# Patient Record
Sex: MALE | Race: BLACK OR AFRICAN AMERICAN | NOT HISPANIC OR LATINO | URBAN - METROPOLITAN AREA
[De-identification: names, ages, dates, MRNs, and addresses within clinical notes are randomized per-mention and may not be internally consistent; named-entity substitution may affect disease eponyms.]

---

## 2017-01-17 ENCOUNTER — EMERGENCY (EMERGENCY)
Facility: HOSPITAL | Age: 32
LOS: 1 days | Discharge: HOME | End: 2017-01-17
Attending: EMERGENCY MEDICINE | Admitting: EMERGENCY MEDICINE
Payer: COMMERCIAL

## 2017-01-17 VITALS
OXYGEN SATURATION: 99 % | RESPIRATION RATE: 16 BRPM | HEART RATE: 65 BPM | HEIGHT: 72 IN | SYSTOLIC BLOOD PRESSURE: 112 MMHG | WEIGHT: 171.96 LBS | TEMPERATURE: 98 F | DIASTOLIC BLOOD PRESSURE: 74 MMHG

## 2017-01-17 DIAGNOSIS — R21 RASH AND OTHER NONSPECIFIC SKIN ERUPTION: ICD-10-CM

## 2017-01-17 PROCEDURE — 99284 EMERGENCY DEPT VISIT MOD MDM: CPT

## 2017-01-17 NOTE — ED PROVIDER NOTE - GENITOURINARY, MLM
raised, scabbed over rash to dorsum of penis with no ulceration, no tenderness, no urethral discharge, no testicular tenderness or masses

## 2017-01-17 NOTE — ED ADULT NURSE NOTE - OBJECTIVE STATEMENT
Pt c/o rash and itching to the perineum area x 4 month. The symptoms are intermittent. Stated "Some time I get sore in mouth." Denies penile discharge, selling, hematuria, pain. C/O gradual hair loss. No distress noted.

## 2017-01-17 NOTE — ED PROVIDER NOTE - MEDICAL DECISION MAKING DETAILS
32 y/o m presents with recurrent genital rash over the past 4 months; has lesions to penis concerning for herpes although have mostly healed, no open ulcer to culture.  Discussed with pt the possibility of herpes, will send hsv antibody test, although informed + result doesn't mean active infection, will f/u with pmd.

## 2017-01-18 LAB
HSV1 IGG SER-ACNC: 57.2 INDEX — HIGH
HSV1 IGG SERPL QL IA: POSITIVE
HSV2 IGG FLD-ACNC: 1.28 INDEX — HIGH
HSV2 IGG SERPL QL IA: POSITIVE

## 2017-01-19 LAB — HSV1 AB FLD QL: NEGATIVE — SIGNIFICANT CHANGE UP

## 2017-01-19 NOTE — ED POST DISCHARGE NOTE - ADDITIONAL DOCUMENTATION
1/20 - pt called, message left about +HSV results, pt placed for further f/u attempts 1/20 - pt called, message left about +HSV results, pt placed for further f/u attempts; 1/26- na x 3, letter sent

## 2017-02-15 ENCOUNTER — APPOINTMENT (OUTPATIENT)
Dept: UROLOGY | Facility: CLINIC | Age: 32
End: 2017-02-15
Payer: MEDICAID

## 2017-02-15 VITALS
BODY MASS INDEX: 22.75 KG/M2 | SYSTOLIC BLOOD PRESSURE: 125 MMHG | HEART RATE: 53 BPM | DIASTOLIC BLOOD PRESSURE: 75 MMHG | HEIGHT: 72 IN | TEMPERATURE: 98.5 F | WEIGHT: 168 LBS

## 2017-02-15 PROCEDURE — 99204 OFFICE O/P NEW MOD 45 MIN: CPT

## 2017-02-16 LAB
APPEARANCE: CLEAR
BACTERIA: ABNORMAL
BILIRUBIN URINE: NEGATIVE
BLOOD URINE: NEGATIVE
COLOR: YELLOW
GLUCOSE QUALITATIVE U: NORMAL MG/DL
HYALINE CASTS: 0 /LPF
KETONES URINE: NEGATIVE
LEUKOCYTE ESTERASE URINE: NEGATIVE
MICROSCOPIC-UA: NORMAL
NITRITE URINE: NEGATIVE
PH URINE: 5.5
PROTEIN URINE: NEGATIVE MG/DL
RED BLOOD CELLS URINE: 2 /HPF
SPECIFIC GRAVITY URINE: 1.02
SQUAMOUS EPITHELIAL CELLS: 2 /HPF
UROBILINOGEN URINE: NORMAL MG/DL
WHITE BLOOD CELLS URINE: 2 /HPF

## 2017-02-17 LAB — BACTERIA UR CULT: NORMAL

## 2017-02-20 LAB — CORE LAB NON GYN CYTOLOGY TO LENOX HILL: NORMAL

## 2017-02-23 ENCOUNTER — FORM ENCOUNTER (OUTPATIENT)
Age: 32
End: 2017-02-23

## 2017-02-24 ENCOUNTER — OUTPATIENT (OUTPATIENT)
Dept: OUTPATIENT SERVICES | Facility: HOSPITAL | Age: 32
LOS: 1 days | End: 2017-02-24
Payer: COMMERCIAL

## 2017-02-24 PROCEDURE — 76770 US EXAM ABDO BACK WALL COMP: CPT | Mod: 26

## 2017-02-24 PROCEDURE — 76770 US EXAM ABDO BACK WALL COMP: CPT

## 2017-03-01 ENCOUNTER — APPOINTMENT (OUTPATIENT)
Dept: UROLOGY | Facility: CLINIC | Age: 32
End: 2017-03-01

## 2017-03-01 VITALS
HEIGHT: 72 IN | HEART RATE: 49 BPM | BODY MASS INDEX: 22.75 KG/M2 | WEIGHT: 168 LBS | SYSTOLIC BLOOD PRESSURE: 114 MMHG | DIASTOLIC BLOOD PRESSURE: 74 MMHG | TEMPERATURE: 98.2 F

## 2017-03-22 ENCOUNTER — APPOINTMENT (OUTPATIENT)
Dept: UROLOGY | Facility: CLINIC | Age: 32
End: 2017-03-22

## 2017-03-22 VITALS
BODY MASS INDEX: 22.75 KG/M2 | SYSTOLIC BLOOD PRESSURE: 118 MMHG | HEART RATE: 50 BPM | WEIGHT: 168 LBS | TEMPERATURE: 98.7 F | HEIGHT: 72 IN | DIASTOLIC BLOOD PRESSURE: 73 MMHG

## 2017-03-22 DIAGNOSIS — N46.9 MALE INFERTILITY, UNSPECIFIED: ICD-10-CM

## 2017-03-22 RX ORDER — CHLORHEXIDINE GLUCONATE, 0.12% ORAL RINSE 1.2 MG/ML
0.12 SOLUTION DENTAL
Qty: 473 | Refills: 0 | Status: ACTIVE | COMMUNITY
Start: 2017-03-15

## 2017-03-27 ENCOUNTER — APPOINTMENT (OUTPATIENT)
Dept: HEART AND VASCULAR | Facility: CLINIC | Age: 32
End: 2017-03-27

## 2017-05-01 ENCOUNTER — APPOINTMENT (OUTPATIENT)
Dept: HEART AND VASCULAR | Facility: CLINIC | Age: 32
End: 2017-05-01

## 2017-05-26 ENCOUNTER — EMERGENCY (EMERGENCY)
Facility: HOSPITAL | Age: 32
LOS: 1 days | Discharge: HOME | End: 2017-05-26
Attending: EMERGENCY MEDICINE | Admitting: EMERGENCY MEDICINE
Payer: COMMERCIAL

## 2017-05-26 VITALS
OXYGEN SATURATION: 99 % | TEMPERATURE: 98 F | RESPIRATION RATE: 17 BRPM | HEART RATE: 54 BPM | SYSTOLIC BLOOD PRESSURE: 111 MMHG | DIASTOLIC BLOOD PRESSURE: 64 MMHG | WEIGHT: 177.03 LBS

## 2017-05-26 DIAGNOSIS — L65.9 NONSCARRING HAIR LOSS, UNSPECIFIED: ICD-10-CM

## 2017-05-26 PROCEDURE — 99282 EMERGENCY DEPT VISIT SF MDM: CPT

## 2017-05-26 NOTE — ED PROVIDER NOTE - CHPI ED SYMPTOMS NEG
no vomiting/no numbness/no weakness/no dizziness/no tingling/no chills/no fever/no pain/no decreased eating/drinking/no nausea

## 2017-05-26 NOTE — ED PROVIDER NOTE - MEDICAL DECISION MAKING DETAILS
patient has no identifiable emergent condition.  he was reassured and given follow up with dermatology

## 2017-05-26 NOTE — ED PROVIDER NOTE - OBJECTIVE STATEMENT
31 y M with "itchy scalp"  Patient complains of scalp pruritis and ha 31 y M with "itchy scalp"  Patient complains of scalp pruritis and hair loss x 2 weeks   Hair loss only scalp   Denies fever or any other complaint  Does not take any medication  Patient under a lot of stress

## 2017-05-26 NOTE — ED ADULT NURSE REASSESSMENT NOTE - NS ED NURSE REASSESS COMMENT FT1
Pt DC'd, pt left ED without notifying ED staff.  Pt ambulatory with even gait, alert and oriented x3.

## 2017-06-21 ENCOUNTER — EMERGENCY (EMERGENCY)
Facility: HOSPITAL | Age: 32
LOS: 1 days | Discharge: HOME | End: 2017-06-21
Admitting: EMERGENCY MEDICINE
Payer: COMMERCIAL

## 2017-06-21 VITALS
TEMPERATURE: 99 F | HEART RATE: 50 BPM | DIASTOLIC BLOOD PRESSURE: 75 MMHG | WEIGHT: 174.83 LBS | OXYGEN SATURATION: 100 % | SYSTOLIC BLOOD PRESSURE: 118 MMHG | RESPIRATION RATE: 14 BRPM

## 2017-06-21 DIAGNOSIS — H57.11 OCULAR PAIN, RIGHT EYE: ICD-10-CM

## 2017-06-21 DIAGNOSIS — H57.8 OTHER SPECIFIED DISORDERS OF EYE AND ADNEXA: ICD-10-CM

## 2017-06-21 PROCEDURE — 99282 EMERGENCY DEPT VISIT SF MDM: CPT

## 2017-06-21 NOTE — ED PROVIDER NOTE - EYES, MLM
right eye +sclera injection, fluorescein stain shows no abrasion/ulcer/fb, visual acuity intact, PERRLA, EOMI

## 2017-06-21 NOTE — ED PROVIDER NOTE - OBJECTIVE STATEMENT
31 y/o m presents stating he scratched his right eye today and then it turned red, tearing.  Pt denies visual changes, photophobia, contact lenses, all other ROS negative.

## 2017-07-19 NOTE — ED PROVIDER NOTE - NS ED MD DISPO DISCHARGE CCDA
ASSESSMENT:  72 year old male with a history of Parkinson's and benign esophageal stricture, who presents with:    1. Post prandial vomiting and diarrhea. Etiology unclear. Distended nontender abdomen.  History of using antidiarrheals. Dr. Snell recommends an abdominal xray to rule out paradoxical diarrhea or severe constipation.       PLAN:  1. Abdominal series.   2. Stop iron. Last CBC WNL. Iron can contribute to constipation.                 HPI: Ray Corona is a  72 year old male with Parkinson's and a history of food impactions, s/p multiple EGD with dilations in the past, who presents with urgent stools and vomiting after eating. Going on since April. Only happens after breakfast and sometimes lunch. Bigger meals cause more problems.  Getting more often, happening few times a week.  Starts with a feeling of constipation or being overly full. Then he vomits and always brown thick liquid comes up. Also has urgent diarrhea at the same time. Usually his stools are twice a day, in the morning and at suppertime. Sometimes no stools and just vomits. Friend giving him anti-diarrheal medication.     This is different from his food impactions. That is painful.  EGD July 29, 2015, remarkable for benign esophageal stricture status post dilation to 18 mm.  On pantoprazole daily.     Eating smaller meals or bland foods has helped. Careful not to eat too much.  Food sticks sometimes but drinking a liquid helps wash it down. Sometimes gets a cough when he eats but not always. Video swallow in 2016 showed significant dysphagia. Repeat video swallow just ordered by neurologist.      Takes sulindac after eating. Changed from Celebrex a month ago. Takes 2 Advil before he goes to bed.          EGD AND COLONOSCOPY REPORT  DATE OF PROCEDURE: 7/29/2015  PROCEDURE: EGD with balloon dilation and Colonoscopy   INDICATION: 70 year old male history of food impaction in the past, dysphagia EGD for further evaluation. Colonoscopy  for screening for colon cancer.  REFERRING PROVIDER:  Dudley Posadas DO  ENDOSCOPIST: Dr. Chad Snell  EQUIPMENT: Olympus GIF and CF scope  ASA CLASS: 2 Normal patient with mild systemic disease  ESTIMATED BLOOD LOSS: <10 ml  ANESTHESIA: Versed 3 mg IV, Fentanyl 75 mcg IV  PROCEDURE TIMES:       Event Tracking          Panel 1             Procedure : COLONOSCOPY      Event Start Time          Procedure Start 1159          Scope In 1201          Cecum Reached 1209          Scope Out 1218          Procedure End                         Procedure : ESOPHAGOGASTRODUODENOSCOPY      Event Start Time          Procedure Start 1154          Scope In            Cecum Reached            Scope Out            Procedure End 1156                       Procedure : ESOPHAGEAL DILATATION      Event Start Time          Procedure Start 1145          Scope In 1151          Cecum Reached            Scope Out 1157          Procedure End 1158                   PATIENT TOLERANCE: Excellent  COMPLICATIONS: None    PROCEDURE:                       Prior to the procedure starting. Risk, benefits and alternatives to the procedure were discussed with the patient. Risks were explained including but not limited to adverse effects of sedation considering their above ASA score, perforation, bleeding and diagnostic error. The patient appeared to understand, asked all questions and agreed with the planned procedure. Written consent was obtained, timeout performed with the patient in the room. The patient also gave permission to leave results on their home/cell phone voicemail if needed. The patient was placed on their left side and conscious sedation was obtained using the medication as above.                        The scope was introduced into the mouth and the upper esophageal sphincter was intubated under direct visualization. The scope was then advanced through the esophagus, stomach and pylorus into the second portion of the duodenum. The scope  was withdrawn slowly with careful examination of the mucosa. Gastric retroflexion was performed and the scope was removed.The patient was turned in the room for the colonoscopy.                       Rectal exam was performed. The scope was introduced into the rectum and advanced under direct visualization to the Cecum. The appendiceal orifice and ileocecal valve were identified by visual landmarks. Prep was Adequate. The scope was then withdrawn slowly with careful visualization of the mucosa. In the rectum, retroflexion was performed and scope was removed.The patient was subsequently transferred to the recovery area in satisfactory condition     FINDINGS EGD:  ? Esophagus - benign-appearing stricture noted at the GE junction. After the rest exam was performed. A through-the-scope balloon was then extended in the stomach. The scope and the balloon were retracted across the GE junction. Initially inflated to 15 mm without resistance and held for 30 seconds. After deflation no evidence of bleeding or tear. Balloon then inflated to 16.5 mm and held for 30 seconds, after deflation, no evidence of bleeding or tear. The balloon was then inflated to 18 mm with resistance and held for 30 seconds. After deflation small amount of blood noted at the stricture site area. However no significant tear or bleeding,  ? Stomach - small hiatal hernia was noted. Diaphragmatic pinch at 42 cm, GE junction at 40 cm   ? Duodenum - Bulb and 2nd portion were normal.      FINDINGS COLONOSCOPY:  ? Diverticula noted in the sigmoid and descending colon  ? Multiple small, vascular lesions in the distal rectum. No evidence of recent bleeding.     ASSESSMENT:  1. Esophageal stricture status post dilation to 18 mm  2. Small hiatal hernia  3. Diverticulosis of the colon  4. 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     RECOMMENDATION:  1. Continue pantoprazole due to esophageal stricture  2. High-fiber diet  3. Repeat colonoscopy in 10  years        Also had EGD's with Dr. Elizabeth (6/18/2015), Dr. Hopper (11/7/2013), Dr. Snell (10/14/2013), Dr. Tejada (11/20/2012)      Past Medical History:   Diagnosis Date   • Arthritis    • Asthma    • AVM (arteriovenous malformation)     rectum   • Chest pain    • Constipation    • Coronary artery disease    • Depression    • Diverticulosis of colon (without mention of hemorrhage)    • Dyslipidemia    • Dysphagia    • Esophageal stricture    • Essential (primary) hypertension    • GERD (gastroesophageal reflux disease)    • Glaucoma    • Malignant neoplasm (CMS/HCC)     prostate 2011   • MI (myocardial infarction) (CMS/HCC) 8/2/2004   • Radiation proctitis    • Rectal bleeding    • Sleep apnea     C-Pap       Past Surgical History:   Procedure Laterality Date   • COLONOSCOPY DIAGNOSTIC  7/29/2015    Dr. Chad Snell. Diverticulosis   • DOPPLER ECHO HEART,COMPLETE  02/18/2011    DONE AT [facility]    • ECHO HEART STRESS  03/21/2009    DOBUTAMINE   • ESOPHAGOGASTRODUODENOSCOPY TRANSORAL FLEX DIAG  11/7/13    Unremarkable - 60F [place]   • ESOPHAGOGASTRODUODENOSCOPY TRANSORAL FLEX DIAG  7/29/2015    Dr. Chad Snell. Esophageal stricture   • HB CATH LT HEART  02/17/2011   • HB CYSTOSCOPY  6/4/14    Flexible-    per Dr. Klein III   • HERNIA REPAIR  4/2003    right and left laproscopic repair   • JOINT REPLACEMENT  4/14/2010    Left total hip replacement   • PTCA  8/4/2004    PTCA/stent LAD and RCA   • SERVICE TO GASTROENTEROLOGY  9/5/1997    EGD-Schatzki's ring dil 30 mm   • SERVICE TO GASTROENTEROLOGY  4/4/2005    EGD-Schatzki's ring dil 50 mm   • SERVICE TO GASTROENTEROLOGY  5/25/2007    colonoscopy - normal   • SERVICE TO GASTROENTEROLOGY  10/28/2008    EGD normal   • SERVICE TO GASTROENTEROLOGY  8/20/2010    EGD with dilatation   • SERVICE TO GASTROENTEROLOGY  2/7/2012    Colonoscopy-cautery AVM rectum       Current Outpatient Prescriptions   Medication   • DULoxetine (CYMBALTA) 60 MG capsule   • MYRBETRIQ 50 MG 24  hr tablet   • sulindac (CLINORIL) 200 MG tablet   • pantoprazole (PROTONIX) 40 MG tablet   • tamsulosin (FLOMAX) 0.4 MG Cap   • pravastatin (PRAVACHOL) 20 MG tablet   • montelukast (SINGULAIR) 10 MG tablet   • busPIRone (BUSPAR) 15 MG tablet   • ezetimibe (ZETIA) 10 MG tablet   • fluticasone-vilanterol (BREO ELLIPTA) 200-25 MCG/INH inhaler   • aspirin 81 MG tablet   • Ferrous Sulfate (IRON) 325 (65 Fe) MG Tab   • Multiple Vitamin (ONE-A-DAY MENS) tablet   • albuterol 108 (90 Base) MCG/ACT inhaler   • albuterol (VENTOLIN) (2.5 MG/3ML) 0.083% nebulizer solution   • cefUROXime (CEFTIN) 250 MG tablet   • carbidopa-levodopa (SINEMET)  MG per tablet   • ramipril (ALTACE) 5 MG capsule   • niacin (NIASPAN) 500 MG CR tablet   • clonazePAM (KLONOPIN) 1 MG tablet   • PROAIR  (90 BASE) MCG/ACT inhaler   • fexofenadine (ALLEGRA) 60 MG tablet   • Brimonidine Tartrate-Timolol 0.2-0.5 % Solution   • acetaminophen (TYLENOL) 500 MG tablet   • dorzolamide (TRUSOPT) 2 % ophthalmic solution   • latanoprost (XALATAN) 0.005 % ophthalmic solution   • Fluticasone-Salmeterol (ADVAIR) 500-50 MCG/DOSE AEPB     No current facility-administered medications for this visit.        ALLERGIES:   Allergen Reactions   • Cat Dander    • Dog Dander    • Dust      Dust mites   • Mold    • Ragweed    • Seasonal Other (See Comments)   • Trees        Family History   Problem Relation Age of Onset   • Heart disease Mother    • Diabetes Mother    • Kidney disease Father    • Diabetes Sister        Social History     Social History   • Marital status:      Spouse name: N/A   • Number of children: N/A   • Years of education: N/A     Occupational History   • Not on file.     Social History Main Topics   • Smoking status: Never Smoker   • Smokeless tobacco: Never Used   • Alcohol use No   • Drug use: No   • Sexual activity: No      Comment:      Other Topics Concern   • Seat Belt Yes     Social History Narrative    ** Merged History  Encounter **         A little wine with communion. No tobacco.     Review of Systems:  Const: No weight loss. No fevers or chills.  Respiratory: No shortness of breath or cough  Cardiovascular: No chest pain  GI: per HPI     Physical Exam:  Visit Vitals  /64   Pulse 88   Ht 5' 9\" (1.753 m)   Wt 99.7 kg   BMI 32.46 kg/m²     General: General appearance without acute distress.  Skin: No jaundice on inspection.  Eyes: No scleral icterus.  ENT: Normal lips, teeth and gums on inspection.  Pulmonary: Clear to auscultation bilaterally.  Cardiovascular: Regular rate and rhythm. No lower extremity edema.  Abdomen: Rounded, protuberant, normoactive bowel sounds, semi-firm, non-tender.  Psych: Mood and affect were appropriate. Judgement and insight appear appropriate.      Labs:  Component      Latest Ref Rng & Units 9/27/2016   WBC      4.2 - 11.0 K/mcL 6.9   RBC      4.50 - 5.90 mil/mcL 4.68   HGB      13.0 - 17.0 g/dL 15.1   HCT      39.0 - 51.0 % 44.6   MCV      78.0 - 100.0 fl 95.3   MCH      26.0 - 34.0 pg 32.3   MCHC      32.0 - 36.5 g/dL 33.9   RDW-CV      11.0 - 15.0 % 13.4   PLT      140 - 450 K/mcL 305     Component      Latest Ref Rng & Units 4/17/2017   Sodium      135 - 145 mmol/L 142   Potassium      3.4 - 5.1 mmol/L 4.7   Chloride      98 - 107 mmol/L 106   CO2      21 - 32 mmol/L 28   ANION GAP      10 - 20 mmol/L 13   Glucose      65 - 99 mg/dL 104 (H)   BUN      6 - 20 mg/dL 19   Creatinine      0.67 - 1.17 mg/dL 0.69   GFR Estimate,        >90   GFR Estimate, Non        >90   BUN/CREATININE RATIO      7 - 25 28 (H)   CALCIUM      8.4 - 10.2 mg/dL 9.0       VIDEO FLUOROSCOPIC SWALLOWING EXAM 7/13/2016  IMPRESSION:  1.  Aspiration on thin, nectar, and honey consistencies. Absent and/or  delayed sensation.  2.  Residue with puree and barium cracker. Retropharyngeal penetration,  with probable subsequent aspiration, is noted with this residue material.  3. Please see Speech  Pathology report for dietary recommendations and  further details.     Patient/Caregiver provided printed discharge information.

## 2017-08-18 ENCOUNTER — EMERGENCY (EMERGENCY)
Facility: HOSPITAL | Age: 32
LOS: 1 days | Discharge: HOME | End: 2017-08-18
Admitting: EMERGENCY MEDICINE
Payer: COMMERCIAL

## 2017-08-18 VITALS
SYSTOLIC BLOOD PRESSURE: 127 MMHG | HEART RATE: 70 BPM | TEMPERATURE: 99 F | RESPIRATION RATE: 18 BRPM | OXYGEN SATURATION: 99 % | DIASTOLIC BLOOD PRESSURE: 80 MMHG

## 2017-08-18 DIAGNOSIS — J06.9 ACUTE UPPER RESPIRATORY INFECTION, UNSPECIFIED: ICD-10-CM

## 2017-08-18 DIAGNOSIS — Z79.899 OTHER LONG TERM (CURRENT) DRUG THERAPY: ICD-10-CM

## 2017-08-18 DIAGNOSIS — R05 COUGH: ICD-10-CM

## 2017-08-18 PROCEDURE — 99284 EMERGENCY DEPT VISIT MOD MDM: CPT

## 2017-08-18 PROCEDURE — 99283 EMERGENCY DEPT VISIT LOW MDM: CPT | Mod: 25

## 2017-08-18 PROCEDURE — 71046 X-RAY EXAM CHEST 2 VIEWS: CPT

## 2017-08-18 PROCEDURE — 71020: CPT | Mod: 26

## 2017-08-18 NOTE — ED PROVIDER NOTE - MEDICAL DECISION MAKING DETAILS
33 y/o m cough x 1 week; afebrile in ED, no consolidation on cxr, likely viral, given rx tessalon perles, f/u pmd, encourage rest, oral hydration

## 2017-08-18 NOTE — ED PROVIDER NOTE - OBJECTIVE STATEMENT
31 y/o m cough x 1 week.  Initially productive of greenish sputum and feeling feverish, both have resolved, now dry cough.  Denies recent travel, sick contacts, CP, SOB, all other ROS negative.

## 2017-09-02 ENCOUNTER — EMERGENCY (EMERGENCY)
Facility: HOSPITAL | Age: 32
LOS: 1 days | Discharge: HOME | End: 2017-09-02
Attending: EMERGENCY MEDICINE | Admitting: EMERGENCY MEDICINE
Payer: COMMERCIAL

## 2017-09-02 VITALS
HEART RATE: 60 BPM | TEMPERATURE: 97 F | RESPIRATION RATE: 18 BRPM | WEIGHT: 162.92 LBS | SYSTOLIC BLOOD PRESSURE: 135 MMHG | OXYGEN SATURATION: 100 % | DIASTOLIC BLOOD PRESSURE: 82 MMHG

## 2017-09-02 LAB
APPEARANCE UR: CLEAR — SIGNIFICANT CHANGE UP
BILIRUB UR-MCNC: NEGATIVE — SIGNIFICANT CHANGE UP
COLOR SPEC: YELLOW — SIGNIFICANT CHANGE UP
DIFF PNL FLD: (no result)
GLUCOSE UR QL: NEGATIVE — SIGNIFICANT CHANGE UP
KETONES UR-MCNC: NEGATIVE — SIGNIFICANT CHANGE UP
LEUKOCYTE ESTERASE UR-ACNC: (no result)
NITRITE UR-MCNC: NEGATIVE — SIGNIFICANT CHANGE UP
PH UR: 6 — SIGNIFICANT CHANGE UP (ref 5–8)
PROT UR-MCNC: NEGATIVE MG/DL — SIGNIFICANT CHANGE UP
SP GR SPEC: <=1.005 — SIGNIFICANT CHANGE UP (ref 1–1.03)
UROBILINOGEN FLD QL: 0.2 E.U./DL — SIGNIFICANT CHANGE UP

## 2017-09-02 PROCEDURE — 81001 URINALYSIS AUTO W/SCOPE: CPT

## 2017-09-02 PROCEDURE — 99283 EMERGENCY DEPT VISIT LOW MDM: CPT | Mod: 25

## 2017-09-02 PROCEDURE — 99283 EMERGENCY DEPT VISIT LOW MDM: CPT

## 2017-09-02 NOTE — ED ADULT NURSE NOTE - OBJECTIVE STATEMENT
pt. presented with c/o left sided groin pain and itchiness "inside penis" for about 3 weeks, pt. denies any abdominal pain, fever, chills, n/v/d, urinary symptoms, discharge or bleeding. pt. is A&Ox3, no acute distress noted, urine was sent.

## 2017-09-02 NOTE — ED ADULT TRIAGE NOTE - CHIEF COMPLAINT QUOTE
left groin pain and swelling for 2 weeks left groin pain and swelling for 2 weeks, pain specially when having intercourse with his wife and also complains of funny feeling on his penis.

## 2017-09-02 NOTE — ED PROVIDER NOTE - MEDICAL DECISION MAKING DETAILS
L groin pain/swelling, however no swelling currently, no evidence of hernia, no rash  -check ua, gc/chlam

## 2017-09-02 NOTE — ED PROVIDER NOTE - OBJECTIVE STATEMENT
32M c/o L groin pain. pt states has had intermittent swelling to vein near base of penis. states occurs typically after sexual intercourse. with some pain. no testicular pain. no rash. no fevers. no dysuria. no abd pain. no n/v/d.

## 2017-09-02 NOTE — ED PROVIDER NOTE - PROGRESS NOTE DETAILS
no urinary complaints. recommend  f/u  I have discussed the discharge plan with the patient. The patient agrees with the plan, as discussed.  The patient understands Emergency Department diagnosis is a preliminary diagnosis often based on limited information and that the patient must adhere to the follow-up plan as discussed.  The patient understands that if the symptoms worsen the patient may return to the Emergency Department at any time for further evaluation and treatment.

## 2017-09-02 NOTE — ED ADULT NURSE NOTE - CHIEF COMPLAINT QUOTE
left groin pain and swelling for 2 weeks, pain specially when having intercourse with his wife and also complains of funny feeling on his penis.

## 2017-09-02 NOTE — ED ADULT NURSE NOTE - CHPI ED SYMPTOMS NEG
no diarrhea/no vomiting/no abdominal distension/no nausea/no fever/no blood in stool/no burning urination/no chills/no dysuria/no hematuria

## 2017-09-03 LAB
C TRACH RRNA SPEC QL NAA+PROBE: DETECTED
N GONORRHOEA RRNA SPEC QL NAA+PROBE: SIGNIFICANT CHANGE UP
SPECIMEN SOURCE: SIGNIFICANT CHANGE UP

## 2017-09-04 ENCOUNTER — EMERGENCY (EMERGENCY)
Facility: HOSPITAL | Age: 32
LOS: 1 days | Discharge: HOME | End: 2017-09-04
Admitting: EMERGENCY MEDICINE
Payer: COMMERCIAL

## 2017-09-04 VITALS
SYSTOLIC BLOOD PRESSURE: 119 MMHG | TEMPERATURE: 100 F | HEART RATE: 65 BPM | WEIGHT: 164.02 LBS | OXYGEN SATURATION: 99 % | RESPIRATION RATE: 18 BRPM | DIASTOLIC BLOOD PRESSURE: 76 MMHG

## 2017-09-04 DIAGNOSIS — Z79.899 OTHER LONG TERM (CURRENT) DRUG THERAPY: ICD-10-CM

## 2017-09-04 DIAGNOSIS — A56.8 SEXUALLY TRANSMITTED CHLAMYDIAL INFECTION OF OTHER SITES: ICD-10-CM

## 2017-09-04 DIAGNOSIS — A74.9 CHLAMYDIAL INFECTION, UNSPECIFIED: ICD-10-CM

## 2017-09-04 LAB — HIV 1+2 AB+HIV1 P24 AG SERPL QL IA: SIGNIFICANT CHANGE UP

## 2017-09-04 PROCEDURE — 96372 THER/PROPH/DIAG INJ SC/IM: CPT

## 2017-09-04 PROCEDURE — 99283 EMERGENCY DEPT VISIT LOW MDM: CPT | Mod: 25

## 2017-09-04 PROCEDURE — 99284 EMERGENCY DEPT VISIT MOD MDM: CPT

## 2017-09-04 PROCEDURE — 86780 TREPONEMA PALLIDUM: CPT

## 2017-09-04 PROCEDURE — 87389 HIV-1 AG W/HIV-1&-2 AB AG IA: CPT

## 2017-09-04 RX ORDER — AZITHROMYCIN 500 MG/1
1 TABLET, FILM COATED ORAL DAILY
Qty: 0 | Refills: 0 | Status: DISCONTINUED | OUTPATIENT
Start: 2017-09-04 | End: 2017-09-08

## 2017-09-04 RX ORDER — CEFTRIAXONE 500 MG/1
250 INJECTION, POWDER, FOR SOLUTION INTRAMUSCULAR; INTRAVENOUS ONCE
Qty: 0 | Refills: 0 | Status: COMPLETED | OUTPATIENT
Start: 2017-09-04 | End: 2017-09-04

## 2017-09-04 RX ADMIN — CEFTRIAXONE 250 MILLIGRAM(S): 500 INJECTION, POWDER, FOR SOLUTION INTRAMUSCULAR; INTRAVENOUS at 15:14

## 2017-09-04 RX ADMIN — AZITHROMYCIN 1 GRAM(S): 500 TABLET, FILM COATED ORAL at 15:13

## 2017-09-04 NOTE — ED PROVIDER NOTE - MEDICAL DECISION MAKING DETAILS
pt was called back by me for + chlamydia - was not tx'd on last ED visit, will tx now, pt agreed to hiv and rpr, understands to use barrier protection and have partner tested and tx'd

## 2017-09-04 NOTE — ED PROVIDER NOTE - OBJECTIVE STATEMENT
The pt is a 31 y/o M, who returns 2/2 to positive chlamydia culture --seen 2 d ago for penile d/c but was not pan tx'd. Pt con't to have yellow penile d/c, is sexually active w/o protection. Denies fevers, chills, dysuria, testicular pain or swelling

## 2017-09-04 NOTE — ED ADULT TRIAGE NOTE - CHIEF COMPLAINT QUOTE
Patient was here last here saturday for penile discharge , received a phone call to come back for treatment .

## 2017-09-05 LAB — T PALLIDUM AB TITR SER: NEGATIVE — SIGNIFICANT CHANGE UP

## 2017-09-06 DIAGNOSIS — R10.32 LEFT LOWER QUADRANT PAIN: ICD-10-CM

## 2017-09-06 DIAGNOSIS — Z79.899 OTHER LONG TERM (CURRENT) DRUG THERAPY: ICD-10-CM

## 2017-09-22 ENCOUNTER — APPOINTMENT (OUTPATIENT)
Dept: UROLOGY | Facility: CLINIC | Age: 32
End: 2017-09-22
Payer: MEDICAID

## 2017-09-22 VITALS
WEIGHT: 168 LBS | TEMPERATURE: 99 F | SYSTOLIC BLOOD PRESSURE: 133 MMHG | DIASTOLIC BLOOD PRESSURE: 78 MMHG | HEART RATE: 52 BPM | BODY MASS INDEX: 22.75 KG/M2 | HEIGHT: 72 IN

## 2017-09-22 DIAGNOSIS — R31.0 GROSS HEMATURIA: ICD-10-CM

## 2017-09-22 DIAGNOSIS — R10.32 LEFT LOWER QUADRANT PAIN: ICD-10-CM

## 2017-09-22 PROCEDURE — 99213 OFFICE O/P EST LOW 20 MIN: CPT

## 2017-09-22 RX ORDER — BENZONATATE 100 MG/1
100 CAPSULE ORAL
Qty: 30 | Refills: 0 | Status: ACTIVE | COMMUNITY
Start: 2017-08-18

## 2017-09-22 RX ORDER — POLYVINYL ALCOHOL 14 MG/ML
1.4 SOLUTION/ DROPS OPHTHALMIC
Qty: 15 | Refills: 0 | Status: ACTIVE | COMMUNITY
Start: 2017-06-21

## 2017-09-23 LAB
APPEARANCE: CLEAR
BACTERIA: NEGATIVE
BILIRUBIN URINE: NEGATIVE
BLOOD URINE: NEGATIVE
COLOR: YELLOW
GLUCOSE QUALITATIVE U: NORMAL MG/DL
KETONES URINE: NEGATIVE
LEUKOCYTE ESTERASE URINE: NEGATIVE
MICROSCOPIC-UA: NORMAL
NITRITE URINE: NEGATIVE
PH URINE: 6
PROTEIN URINE: NEGATIVE MG/DL
RED BLOOD CELLS URINE: 0 /HPF
SPECIFIC GRAVITY URINE: 1.01
SQUAMOUS EPITHELIAL CELLS: 0 /HPF
UROBILINOGEN URINE: NORMAL MG/DL
WHITE BLOOD CELLS URINE: 0 /HPF

## 2017-09-24 LAB — BACTERIA UR CULT: NORMAL

## 2018-03-01 ENCOUNTER — EMERGENCY (EMERGENCY)
Facility: HOSPITAL | Age: 33
LOS: 1 days | Discharge: HOME | End: 2018-03-01
Admitting: EMERGENCY MEDICINE
Payer: COMMERCIAL

## 2018-03-01 VITALS
TEMPERATURE: 98 F | OXYGEN SATURATION: 98 % | SYSTOLIC BLOOD PRESSURE: 121 MMHG | RESPIRATION RATE: 16 BRPM | HEART RATE: 47 BPM | DIASTOLIC BLOOD PRESSURE: 67 MMHG | WEIGHT: 171.96 LBS

## 2018-03-01 DIAGNOSIS — Z79.899 OTHER LONG TERM (CURRENT) DRUG THERAPY: ICD-10-CM

## 2018-03-01 DIAGNOSIS — N39.0 URINARY TRACT INFECTION, SITE NOT SPECIFIED: ICD-10-CM

## 2018-03-01 DIAGNOSIS — R30.0 DYSURIA: ICD-10-CM

## 2018-03-01 DIAGNOSIS — Z20.2 CONTACT WITH AND (SUSPECTED) EXPOSURE TO INFECTIONS WITH A PREDOMINANTLY SEXUAL MODE OF TRANSMISSION: ICD-10-CM

## 2018-03-01 LAB
APPEARANCE UR: CLEAR — SIGNIFICANT CHANGE UP
BILIRUB UR-MCNC: NEGATIVE — SIGNIFICANT CHANGE UP
C TRACH RRNA SPEC QL NAA+PROBE: SIGNIFICANT CHANGE UP
COLOR SPEC: YELLOW — SIGNIFICANT CHANGE UP
DIFF PNL FLD: (no result)
GLUCOSE UR QL: NEGATIVE — SIGNIFICANT CHANGE UP
KETONES UR-MCNC: NEGATIVE — SIGNIFICANT CHANGE UP
LEUKOCYTE ESTERASE UR-ACNC: (no result)
N GONORRHOEA RRNA SPEC QL NAA+PROBE: SIGNIFICANT CHANGE UP
NITRITE UR-MCNC: NEGATIVE — SIGNIFICANT CHANGE UP
PH UR: 6 — SIGNIFICANT CHANGE UP (ref 5–8)
PROT UR-MCNC: NEGATIVE MG/DL — SIGNIFICANT CHANGE UP
SP GR SPEC: <=1.005 — SIGNIFICANT CHANGE UP (ref 1–1.03)
SPECIMEN SOURCE: SIGNIFICANT CHANGE UP
UROBILINOGEN FLD QL: 0.2 E.U./DL — SIGNIFICANT CHANGE UP

## 2018-03-01 PROCEDURE — 96372 THER/PROPH/DIAG INJ SC/IM: CPT

## 2018-03-01 PROCEDURE — 99283 EMERGENCY DEPT VISIT LOW MDM: CPT | Mod: 25

## 2018-03-01 PROCEDURE — 87389 HIV-1 AG W/HIV-1&-2 AB AG IA: CPT

## 2018-03-01 PROCEDURE — 99284 EMERGENCY DEPT VISIT MOD MDM: CPT | Mod: 25

## 2018-03-01 PROCEDURE — 87086 URINE CULTURE/COLONY COUNT: CPT

## 2018-03-01 PROCEDURE — 81001 URINALYSIS AUTO W/SCOPE: CPT

## 2018-03-01 PROCEDURE — 86780 TREPONEMA PALLIDUM: CPT

## 2018-03-01 PROCEDURE — 36415 COLL VENOUS BLD VENIPUNCTURE: CPT

## 2018-03-01 RX ORDER — CIPROFLOXACIN LACTATE 400MG/40ML
500 VIAL (ML) INTRAVENOUS ONCE
Qty: 0 | Refills: 0 | Status: COMPLETED | OUTPATIENT
Start: 2018-03-01 | End: 2018-03-01

## 2018-03-01 RX ORDER — MOXIFLOXACIN HYDROCHLORIDE TABLETS, 400 MG 400 MG/1
1 TABLET, FILM COATED ORAL
Qty: 14 | Refills: 0
Start: 2018-03-01 | End: 2018-03-07

## 2018-03-01 RX ORDER — CEFTRIAXONE 500 MG/1
250 INJECTION, POWDER, FOR SOLUTION INTRAMUSCULAR; INTRAVENOUS ONCE
Qty: 0 | Refills: 0 | Status: COMPLETED | OUTPATIENT
Start: 2018-03-01 | End: 2018-03-01

## 2018-03-01 RX ORDER — AZITHROMYCIN 500 MG/1
1 TABLET, FILM COATED ORAL ONCE
Qty: 0 | Refills: 0 | Status: COMPLETED | OUTPATIENT
Start: 2018-03-01 | End: 2018-03-01

## 2018-03-01 RX ADMIN — AZITHROMYCIN 1 GRAM(S): 500 TABLET, FILM COATED ORAL at 02:29

## 2018-03-01 RX ADMIN — CEFTRIAXONE 250 MILLIGRAM(S): 500 INJECTION, POWDER, FOR SOLUTION INTRAMUSCULAR; INTRAVENOUS at 02:29

## 2018-03-01 RX ADMIN — Medication 500 MILLIGRAM(S): at 03:16

## 2018-03-01 NOTE — ED PROVIDER NOTE - MEDICAL DECISION MAKING DETAILS
discussed safe sex precautions + declined HIV testing + STD testing discussed discussed safe sex precautions + declined HIV testing + STD testing discussed + appreciate wbc in urine with give cipro course as well

## 2018-03-01 NOTE — ED PROVIDER NOTE - CARE PLAN
Principal Discharge DX:	S/P abdominal aortic aneurysm repair Principal Discharge DX:	STD exposure Principal Discharge DX:	STD exposure  Secondary Diagnosis:	UTI (urinary tract infection)

## 2018-03-01 NOTE — ED ADULT NURSE NOTE - CHPI ED SYMPTOMS NEG
no fever/no abdominal distension/no blood in stool/no vomiting/no diarrhea/no chills/no hematuria/no nausea

## 2018-03-01 NOTE — ED PROVIDER NOTE - OBJECTIVE STATEMENT
unprotected relations with non regular partner 10 days ago + dysuria x several days  and concern and thus to ED no fever

## 2018-03-02 LAB
CULTURE RESULTS: NO GROWTH — SIGNIFICANT CHANGE UP
SPECIMEN SOURCE: SIGNIFICANT CHANGE UP
T PALLIDUM AB TITR SER: NEGATIVE — SIGNIFICANT CHANGE UP

## 2018-03-06 NOTE — ED POST DISCHARGE NOTE - ADDITIONAL DOCUMENTATION
patient called requesting test results.  all results discussed, unclear cause of urinary symptoms.  recommended he follow up with PCP/urologist.

## 2018-10-01 NOTE — ED PROVIDER NOTE - EYES, MLM
[facility] Caitlyn ESTRADA (oncoming nurse) by Garett Wright (offgoing nurse). Report included the following information SBAR, Kardex, ED Summary, Intake/Output, MAR, Recent Results and Cardiac Rhythm NSR. Drips verified: NS at 75 mL/hr and Levo at 4 mcg/min. Clear bilaterally,

## 2019-02-03 ENCOUNTER — EMERGENCY (EMERGENCY)
Facility: HOSPITAL | Age: 34
LOS: 1 days | Discharge: HOME | End: 2019-02-03
Admitting: EMERGENCY MEDICINE
Payer: COMMERCIAL

## 2019-02-03 VITALS
HEIGHT: 73 IN | HEART RATE: 58 BPM | RESPIRATION RATE: 18 BRPM | DIASTOLIC BLOOD PRESSURE: 59 MMHG | TEMPERATURE: 98 F | WEIGHT: 169.98 LBS | OXYGEN SATURATION: 99 % | SYSTOLIC BLOOD PRESSURE: 117 MMHG

## 2019-02-03 PROCEDURE — 99283 EMERGENCY DEPT VISIT LOW MDM: CPT

## 2019-02-03 PROCEDURE — 99283 EMERGENCY DEPT VISIT LOW MDM: CPT | Mod: 25

## 2019-02-03 RX ORDER — IBUPROFEN 200 MG
600 TABLET ORAL ONCE
Qty: 0 | Refills: 0 | Status: COMPLETED | OUTPATIENT
Start: 2019-02-03 | End: 2019-02-03

## 2019-02-03 RX ORDER — IBUPROFEN 200 MG
1 TABLET ORAL
Qty: 30 | Refills: 0
Start: 2019-02-03 | End: 2019-02-12

## 2019-02-03 RX ADMIN — Medication 600 MILLIGRAM(S): at 05:53

## 2019-02-03 NOTE — ED PROVIDER NOTE - MEDICAL DECISION MAKING DETAILS
This is a pleasant 33 year old male presenting to the ed with right lower back pain since friday, history of going to the gym and "doing squats". ttp right lumbar paraspinal muscles with spasm. Patient currently does not not have any symptoms concerning for cauda equina syndrome such as saddle anesthesia, bowel or bladder incontinence or abscess. Patient does not endorse trauma to the area and therefore do not believe that imaging is warrented at this time. No history of iv drug use or history of cancer. No note of fever on vitals. Believe that pain is most likeley muscular in nature. High sensitivity neurologic exam does not exhibit deficit on physical examination. Patient was advised to avoid driving, operating heavy machinery, or drinking alcohol when taking pain medication and/or muscle relaxers and to avoid heavy lifting and pain exacerbatieng movement. Patient is advised to use heat as well as ice and massage and stay active. Patient was advised to avoid pain exacerbation activities as well as avoid prolonged sitting, standing and bed rest. ED evaluation and management discussed with the patient and family (if available) in detail.  Close PMD follow up encouraged.  Strict ED return instructions discussed in detail and patient given the opportunity to ask any questions about their discharge diagnosis and instructions. Patient verbalized understanding. Patient is agreeable to plan.

## 2019-02-03 NOTE — ED ADULT NURSE NOTE - PRIMARY CARE PROVIDER
Verified Results  (Q) THINPREP TIS PAP RFX HPV [id number] 12:00AM Mcnary Cotton     Test Name Result Flag Reference   CLINICAL INFORMATION:      NONE GIVEN   LMP:      NONE GIVEN   PREV  PAP:      NONE GIVEN   PREV  BX:      NONE GIVEN   SOURCE:      CERVICAL   STATEMENT OF ADEQUACY:      SATISFACTORY FOR EVALUATION   INTERPRETATION/RESULT:      Negative for intraepithelial lesion or malignancy  Atrophic pattern; predominantly parabasal cells   COMMENT:      This Pap test has been evaluated with computer  assisted technology     CYTOTECHNOLOGIST:      ALK, CT(ASCP)  CT screening location: [address] [place], [place] [id number] 12:00AM Mcnary Cotton     Test Name Result Flag Reference   STATUS FINAL     CONTAINER TYPE: TP     QUESTION/PROBLEM CLARIFY SOURCE     FINAL RESOLUTION SOURCE = CERVICAL
nil

## 2019-02-03 NOTE — ED PROVIDER NOTE - PHYSICAL EXAMINATION
General Appearance: Patient is well developed and well nourished. Patient is lying in stretcher, not diaphoretic and does not appear in acute distress.      Pulm: Breath sounds present throughout all lung fields. Chest expansion symmetric bilaterally. No evidence of wheezes, rales, rhonchi or retraction.       Cardio: Regular rate and rhythm. No murmurs, rubs or gallops.    Abdomen: Bowel sounds present all 4 quadrants Soft nontender. no note of pulsatile masses. No rebound or guarding.      MSK: ttp right lumbar paraspinal muscles with associated spasm. No evidence of edema, erythema or bony deformity on body. Strength 5/5 in arms and legs bilaterally. No evidence of paraspinal muscle spasm/tenderness. There is no spinal midline tenderness, step offs or crepitus. ROM of the      Neuro: Distal sensation intact in arms and legs bilaterally. Sensory Knee and ankle reflexes 2+ and symmetric bilaterally. gait steady. gcs 15.    psych: mood and affect appropriate.     skin: warm dry and intact- no note of erythema edema purpura petechia ecchymosis

## 2019-02-03 NOTE — ED PROVIDER NOTE - OBJECTIVE STATEMENT
States that it began on friday while he was at the gym doing squates. has not taken medication for his symptoms. does not endorse taking pain medication. no numbness tingling saddle anesthesia or bowel/bladder incontinence. states that pain is worse in the morning and subsides throughout the day. states that heat makes the pain better. no injury or trauma.

## 2019-02-03 NOTE — ED PROVIDER NOTE - NSFOLLOWUPINSTRUCTIONS_ED_ALL_ED_FT
Log Out.    [employer]® CareNotes®     :  [facility]             FINGER LACERATION - AfterCare(R) Instructions(ER/ED)     Finger Laceration    WHAT YOU NEED TO KNOW:    A finger laceration is a deep cut in your skin. It is often caused by a sharp object, such as a knife, or blunt force to your finger. Your blood vessels, bones, joints, tendons, or nerves may also be injured.     DISCHARGE INSTRUCTIONS:    Return to the emergency department if:     Your wound comes apart.       Blood soaks through your bandage.      You have severe pain in your finger or hand.       Your finger is pale and cold.       You have sudden trouble moving your finger.       Your swelling suddenly gets worse.       You have red streaks on your skin coming from your wound.     Contact your healthcare provider or hand specialist if:     You have new numbness or tingling.       Your finger feels warm, looks swollen or red, and is draining pus.       You have a fever.       You have questions or concerns about your condition or care.     Medicines: You may need any of the following:     Antibiotics help prevent a bacterial infection.       Acetaminophen decreases pain and fever. It is available without a doctor's order. Ask how much to take and how often to take it. Follow directions. Read the labels of all other medicines you are using to see if they also contain acetaminophen, or ask your doctor or pharmacist. Acetaminophen can cause liver damage if not taken correctly. Do not use more than 4 grams (4,000 milligrams) total of acetaminophen in one day.       Prescription pain medicine may be given. Ask your healthcare provider how to take this medicine safely. Some prescription pain medicines contain acetaminophen. Do not take other medicines that contain acetaminophen without talking to your healthcare provider. Too much acetaminophen may cause liver damage. Prescription pain medicine may cause constipation. Ask your healthcare provider how to prevent or treat constipation.       Take your medicine as directed. Contact your healthcare provider if you think your medicine is not helping or if you have side effects. Tell him or her if you are allergic to any medicine. Keep a list of the medicines, vitamins, and herbs you take. Include the amounts, and when and why you take them. Bring the list or the pill bottles to follow-up visits. Carry your medicine list with you in case of an emergency.    Self-care:     Apply ice on your finger for 15 to 20 minutes every hour or as directed. Use an ice pack, or put crushed ice in a plastic bag. Cover it with a towel before you apply it to your skin. Ice helps prevent tissue damage and decreases swelling and pain.      Elevate your hand above the level of your heart as often as you can. This will help decrease swelling and pain. Prop your hand on pillows or blankets to keep it elevated comfortably.       Wear your splint as directed. A splint will decrease movement and stress on your wound. The splint may help your wound heal faster. Ask your healthcare provider how to apply and remove a splint.       Apply ointments to decrease scarring. Do not apply ointments until your healthcare provider says it is okay. You may need to wait until your wound is healed. Ask which ointment to buy and how often to use it.     Wound care:     Do not get your wound wet until your healthcare provider says it is okay. Do not soak your hand in water. Do not go swimming until your healthcare provider says it is okay. When your healthcare provider says it is okay, carefully wash around the wound with soap and water. Let soap and water run over your wound. Gently pat the area dry or allow it to air dry.       Change your bandages when they get wet, dirty, or after washing. Apply new, clean bandages as directed. Do not apply elastic bandages or tape too tightly. Do not put powders or lotions on your wound.       Apply antibiotic ointment as directed. Your healthcare provider may give you antibiotic ointment to put over your wound if you have stitches. If you have Strips-Strips™ over your wound, let them dry up and fall off on their own. If they do not fall off within 14 days, gently remove them. If you have glue over your wound, do not remove or pick at it. If your glue comes off, do not replace it with glue that you have at home.       Check your wound every day for signs of infection. Signs of infection include swelling, redness, or pus.     Follow up with your healthcare provider or hand specialist in 2 days: Write down your questions so you remember to ask them during your visits.        © Copyright [employer] 2019 All illustrations and images included in CareNotes are the copyrighted property of [employer]. or [employer].      back to top                      © Copyright [employer] 2019

## 2019-02-03 NOTE — ED ADULT NURSE NOTE - NSIMPLEMENTINTERV_GEN_ALL_ED
Implemented All Universal Safety Interventions:  [place] to call system. Call bell, personal items and telephone within reach. Instruct patient to call for assistance. Room bathroom lighting operational. Non-slip footwear when patient is off stretcher. Physically safe environment: no spills, clutter or unnecessary equipment. Stretcher in lowest position, wheels locked, appropriate side rails in place.

## 2019-02-03 NOTE — ED ADULT TRIAGE NOTE - CHIEF COMPLAINT QUOTE
lower back pain/ rt hip pain noted yesterday after lifting weights at the gym.Pt denies urinary problem,.

## 2019-02-07 DIAGNOSIS — M62.830 MUSCLE SPASM OF BACK: ICD-10-CM

## 2019-02-07 DIAGNOSIS — M54.5 LOW BACK PAIN: ICD-10-CM

## 2019-02-07 DIAGNOSIS — Z79.2 LONG TERM (CURRENT) USE OF ANTIBIOTICS: ICD-10-CM

## 2019-02-07 DIAGNOSIS — Z79.1 LONG TERM (CURRENT) USE OF NON-STEROIDAL ANTI-INFLAMMATORIES (NSAID): ICD-10-CM

## 2019-02-07 DIAGNOSIS — Z79.899 OTHER LONG TERM (CURRENT) DRUG THERAPY: ICD-10-CM

## 2019-07-16 ENCOUNTER — EMERGENCY (EMERGENCY)
Facility: HOSPITAL | Age: 34
LOS: 1 days | Discharge: HOME | End: 2019-07-16
Admitting: EMERGENCY MEDICINE
Payer: SELF-PAY

## 2019-07-16 VITALS
RESPIRATION RATE: 18 BRPM | WEIGHT: 171.96 LBS | DIASTOLIC BLOOD PRESSURE: 77 MMHG | TEMPERATURE: 99 F | OXYGEN SATURATION: 100 % | HEART RATE: 57 BPM | SYSTOLIC BLOOD PRESSURE: 126 MMHG | HEIGHT: 72 IN

## 2019-07-16 DIAGNOSIS — H10.13 ACUTE ATOPIC CONJUNCTIVITIS, BILATERAL: ICD-10-CM
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
## 2019-07-16 DIAGNOSIS — M54.5 LOW BACK PAIN: ICD-10-CM

## 2019-07-16 DIAGNOSIS — S39.012A STRAIN OF MUSCLE, FASCIA AND TENDON OF LOWER BACK, INITIAL ENCOUNTER: ICD-10-CM
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
## 2019-07-16 PROCEDURE — 99283 EMERGENCY DEPT VISIT LOW MDM: CPT

## 2019-07-16 PROCEDURE — 96372 THER/PROPH/DIAG INJ SC/IM: CPT

## 2019-07-16 PROCEDURE — 99284 EMERGENCY DEPT VISIT MOD MDM: CPT | Mod: 25

## 2019-07-16 RX ORDER — IBUPROFEN 200 MG
1 TABLET ORAL
Qty: 15 | Refills: 0
Start: 2019-07-16 | End: 2019-07-20

## 2019-07-16 RX ORDER — LIDOCAINE 4 G/100G
1 CREAM TOPICAL ONCE
Refills: 0 | Status: COMPLETED | OUTPATIENT
Start: 2019-07-16 | End: 2019-07-16

## 2019-07-16 RX ORDER — DIAZEPAM 5 MG
5 TABLET ORAL ONCE
Refills: 0 | Status: DISCONTINUED | OUTPATIENT
Start: 2019-07-16 | End: 2019-07-16

## 2019-07-16 RX ORDER — METHOCARBAMOL 500 MG/1
2 TABLET, FILM COATED ORAL
Qty: 24 | Refills: 0
Start: 2019-07-16 | End: 2019-07-18

## 2019-07-16 RX ORDER — KETOROLAC TROMETHAMINE 30 MG/ML
60 SYRINGE (ML) INJECTION ONCE
Refills: 0 | Status: DISCONTINUED | OUTPATIENT
Start: 2019-07-16 | End: 2019-07-16

## 2019-07-16 RX ORDER — CETIRIZINE HYDROCHLORIDE 10 MG/1
1 TABLET ORAL
Qty: 30 | Refills: 0
Start: 2019-07-16 | End: 2019-08-14

## 2019-07-16 RX ORDER — OLOPATADINE HYDROCHLORIDE 1 MG/ML
1 SOLUTION/ DROPS OPHTHALMIC
Qty: 1 | Refills: 0
Start: 2019-07-16 | End: 2019-07-22

## 2019-07-16 RX ADMIN — Medication 5 MILLIGRAM(S): at 18:44

## 2019-07-16 RX ADMIN — Medication 60 MILLIGRAM(S): at 18:43

## 2019-07-16 RX ADMIN — LIDOCAINE 1 PATCH: 4 CREAM TOPICAL at 18:43

## 2019-07-16 NOTE — ED PROVIDER NOTE - OBJECTIVE STATEMENT
34-year-old male presenting to the ED with right lower back pain earlier today after picking up a bag of fruits. Since then, pt reports worsening pain with movement. Pt denies taking medication for pain. He also denies numbness, tingling, saddle anesthesia, abdominal pain, and hematuria. Pt states he pulled his back out when lifting weights at the gym before, but this is worse. Pt also c/o bilateral eye itching x1 month with tearing and redness at times. He denies rhinorrhea, sore throat, coughs, and has not taken any meds for sxs.

## 2019-07-16 NOTE — ED PROVIDER NOTE - CLINICAL SUMMARY MEDICAL DECISION MAKING FREE TEXT BOX
34-year-old male with no PMHx, presenting to the ED with right lower back pain after bending to pick something Pt also has x1 month of eye itching. Pt denies numbness, incontinence, or visual changes. On PE, PEERL, conjunctiva learned, EOMI, and tenderness to palpation over left lumbar paraspinal muscle. Likely lumbar strain. Will treat pain. Likely allergic conjunctivitis. Will give allergy eyedrops.

## 2019-07-16 NOTE — ED ADULT TRIAGE NOTE - OTHER COMPLAINTS
patient reports back pain to R lower side after picking up heavy objects---also endorsing bilat eye itchiness---no discharge or redness noted. denies urinary/bowel incontinence.

## 2019-07-16 NOTE — ED ADULT NURSE NOTE - OBJECTIVE STATEMENT
c.o lower back pain after bending down to grab a fruit. denies any numbness/tingling to lower extremities or bowel/bladder incontinence.

## 2019-07-16 NOTE — ED PROVIDER NOTE - CARE PLAN
Principal Discharge DX:	Lumbar strain, initial encounter  Secondary Diagnosis:	Allergic conjunctivitis of both eyes

## 2021-04-13 ENCOUNTER — EMERGENCY (EMERGENCY)
Facility: HOSPITAL | Age: 36
LOS: 1 days | Discharge: HOME | End: 2021-04-13
Admitting: EMERGENCY MEDICINE
Payer: COMMERCIAL

## 2021-04-13 VITALS
TEMPERATURE: 99 F | OXYGEN SATURATION: 100 % | DIASTOLIC BLOOD PRESSURE: 73 MMHG | HEART RATE: 53 BPM | WEIGHT: 173.06 LBS | HEIGHT: 72 IN | SYSTOLIC BLOOD PRESSURE: 127 MMHG | RESPIRATION RATE: 16 BRPM

## 2021-04-13 DIAGNOSIS — Z20.822 CONTACT WITH AND (SUSPECTED) EXPOSURE TO COVID-19: ICD-10-CM

## 2021-04-13 DIAGNOSIS — Z79.899 OTHER LONG TERM (CURRENT) DRUG THERAPY: ICD-10-CM

## 2021-04-13 DIAGNOSIS — R05 COUGH: ICD-10-CM

## 2021-04-13 DIAGNOSIS — Z79.1 LONG TERM (CURRENT) USE OF NON-STEROIDAL ANTI-INFLAMMATORIES (NSAID): ICD-10-CM

## 2021-04-13 LAB — SARS-COV-2 RNA SPEC QL NAA+PROBE: SIGNIFICANT CHANGE UP

## 2021-04-13 PROCEDURE — 99283 EMERGENCY DEPT VISIT LOW MDM: CPT

## 2021-04-13 PROCEDURE — 71046 X-RAY EXAM CHEST 2 VIEWS: CPT

## 2021-04-13 PROCEDURE — 71046 X-RAY EXAM CHEST 2 VIEWS: CPT | Mod: 26

## 2021-04-13 PROCEDURE — 99284 EMERGENCY DEPT VISIT MOD MDM: CPT

## 2021-04-13 PROCEDURE — U0005: CPT

## 2021-04-13 PROCEDURE — U0003: CPT

## 2021-04-13 RX ORDER — FAMOTIDINE 10 MG/ML
1 INJECTION INTRAVENOUS
Qty: 30 | Refills: 0
Start: 2021-04-13

## 2021-04-13 NOTE — ED ADULT TRIAGE NOTE - CHIEF COMPLAINT QUOTE
Pt c/o dry cough for 6months to 1 year. Denies any CP, SOB, fevers. Declines any worsening of cough today, just wanted to be evaluated. Well appearing.

## 2021-04-13 NOTE — ED PROVIDER NOTE - CLINICAL SUMMARY MEDICAL DECISION MAKING FREE TEXT BOX
36 y/o m presents c/o cough x 1 year; pt afebrile, no coughing noted in ED, clear lungs.  CXR unremarkable, will give rx tessalon perles, pepcid for ?GERD sx, f/u pmd

## 2021-04-13 NOTE — ED PROVIDER NOTE - OBJECTIVE STATEMENT
34 y/o male with no significant PMHx presents for chronic dry cough x 1yr. Pt states the cough comes and goes, however when he coughs he gets into "coughing fits" and cannot stop. He denies any posttussive emesis. He states yesterday while coughing he felt a sharp pain in his chest, which is why he came to the ER today. He denies any known triggers. He thought maybe it was peanuts, but after cutting out peanuts he continues to cough. Pt denies any history of asthma, chronic sinusitis, seasonal allergies, or hematemesis. Denies fever and chills.   Denies ever smoking, drinking alcohol, or illicit drug use. Pt has a PCP here at [facility], but has not seen him for cough.

## 2021-04-13 NOTE — ED PROVIDER NOTE - PATIENT PORTAL LINK FT
You can access the FollowMyHealth Patient Portal offered by [facility] by registering at the following website: [email or web address]. By joining [facility]’s FollowMyHealth portal, you will also be able to view your health information using other applications (apps) compatible with our system.

## 2021-04-13 NOTE — ED ADULT NURSE NOTE - BREATHING, MLM
Fwd to     Please advise on refill for travel sickness.     Form has been received and forward it to  in box for signature.    Spontaneous, unlabored and symmetrical

## 2021-04-13 NOTE — ED PROVIDER NOTE - NSFOLLOWUPINSTRUCTIONS_ED_ALL_ED_FT
Chronic Cough    WHAT YOU NEED TO KNOW:    A chronic cough is a cough that lasts more than 4 weeks in children or 8 weeks in adults.    DISCHARGE INSTRUCTIONS:    Call 911 for any of the following:   •You cough up blood.       •You faint when you cough.      •You have trouble breathing.      Contact your healthcare provider if:   •You have new or worsening symptoms.       •You have severe pain when you take a deep breath.      •You become very tired after a coughing fit.      •You have trouble sleeping because of the coughing.      •You have questions or concerns about your condition or care.      Medicines:   •Medicines may be needed to stop your cough. You may also need medicine to treat allergies or acid reflux, or decrease swelling in your airways. If you have a respiratory infection, you may need antibiotics. Medicine may be given as a pill or to use in an inhaler.       •Take your medicine as directed. Contact your healthcare provider if you think your medicine is not helping or if you have side effects. Tell him or her if you are allergic to any medicine. Keep a list of the medicines, vitamins, and herbs you take. Include the amounts, and when and why you take them. Bring the list or the pill bottles to follow-up visits. Carry your medicine list with you in case of an emergency.      Self-care:   •Prevent acid reflex. Acid reflux can make your chronic cough worse. Raise your head and upper back when you sleep. Place 2 or more pillows behind your head or sleep in a recliner. Do not lie down for at least 1 hour after you eat. Do not have foods or drinks that increase heartburn. Ask your healthcare provider for other ways to prevent acid reflux.       •Do not smoke. Encourage your adolescent child not to smoke. Nicotine and other chemicals in cigarettes and cigars can cause lung damage. They can also make your cough worse. Ask your healthcare provider for information if you currently smoke and need help to quit. E-cigarettes or smokeless tobacco still contain nicotine. Talk to your healthcare provider before you use these products.       •Stay away from secondhand smoke. Do not let people smoke in your car, home, or near your child. Do not stand near someone that is smoking. This includes anyone that is smoking an E-cigarrete.       •Avoid anything that triggers your allergies or irritates your throat. Allergens and irritants can make your chronic cough worse. Allergens may include dust mites, pollen, pet dander, or mold. Wear a mask if you work around pollutants or irritants. Ask your healthcare provider for more ways to decrease your exposure to allergens or irritants.       •Drink plenty of liquids as directed. Liquids may help relieve throat discomfort that causes you to cough. Add honey to tea or hot water to help ease your throat pain. Ask how much liquid to drink each day and which liquids are best for you.       Follow up with your healthcare provider as directed: You may need to return for more tests. Your healthcare provider may refer to you other specialists. Write down your questions so you remember to ask them during your visits.

## 2021-04-13 NOTE — ED ADULT TRIAGE NOTE - BSA (M2)
Backchannelmedia Access Code: Activation code not generated  Current Backchannelmedia Status: Active    [employer]  A secure, online tool to manage your health information     [employer] Backchannelmedia® is a secure, online tool that connects you to your personalized health information from the privacy of your home -- day or night - making it very easy for you to manage your healthcare. Once the activation process is completed, you can even access your medical information using the Backchannelmedia abby, which is available for free in the Apple Abby store or Google Play store.     Backchannelmedia provides the following levels of access (as shown below):   My Chart Features   [facility] Primary Care Doctor [facility]  Specialists [facility]  Urgent  Care Non-[facility]  Primary Care  Doctor   Email your healthcare team securely and privately 24/7 X X X X   Manage appointments: schedule your next appointment; view details of past/upcoming appointments X      Request prescription refills. X      View recent personal medical records, including lab and immunizations X X X X   View health record, including health history, allergies, medications X X X X   Read reports about your outpatient visits, procedures, consult and ER notes X X X X   See your discharge summary, which is a recap of your hospital and/or ER visit that includes your diagnosis, lab results, and care plan. X X       How to register for Backchannelmedia:  1. Go to  [email or web address].  2. Click on the Sign Up Now box, which takes you to the New Member Sign Up page. You will need to provide the following information:  a. Enter your Backchannelmedia Access Code exactly as it appears at the top of this page. (You will not need to use this code after you’ve completed the sign-up process. If you do not sign up before the expiration date, you must request a new code.)   b. Enter your date of birth.   c. Enter your home email address.   d. Click Submit, and follow the next screen’s instructions.  3. Create a Backchannelmedia ID. This will  2 be your [employer] login ID and cannot be changed, so think of one that is secure and easy to remember.  4. Create a [employer] password. You can change your password at any time.  5. Enter your Password Reset Question and Answer. This can be used at a later time if you forget your password.   6. Enter your e-mail address. This allows you to receive e-mail notifications when new information is available in [employer]ence.  7. Click Sign Up. You can now view your health information.    For assistance activating your [employer] account, call [phone]

## 2021-06-06 NOTE — ED PROVIDER NOTE - NSFOLLOWUPINSTRUCTIONS_ED_ALL_ED_FT
Refill Approved    Rx renewed per Medication Renewal Policy. Medication was last renewed on 11/29/19.    Cristy Grey, [facility] Connection Triage/Med Refill 2/28/2020     Requested Prescriptions   Pending Prescriptions Disp Refills     amLODIPine (NORVASC) 10 MG tablet [Pharmacy Med Name: amLODIPine Besylate 10 MG Oral Tablet] 90 tablet 0     Sig: TAKE 1 TABLET BY MOUTH ONCE DAILY       Calcium-Channel Blockers Protocol Passed - 2/28/2020 12:20 PM        Passed - PCP or prescribing provider visit in past 12 months or next 3 months     Last office visit with prescriber/PCP: 2/11/2020 Abdulkadir Rodriguez MD OR same dept: 2/11/2020 Abdulkadir Rodriguez MD OR same specialty: 2/11/2020 Abdulkadir Rodriguez MD  Last physical: Visit date not found Last MTM visit: Visit date not found   Next visit within 3 mo: Visit date not found  Next physical within 3 mo: Visit date not found  Prescriber OR PCP: Abdulkadir Rodriguez MD  Last diagnosis associated with med order: 1. Essential hypertension  - amLODIPine (NORVASC) 10 MG tablet [Pharmacy Med Name: amLODIPine Besylate 10 MG Oral Tablet]; TAKE 1 TABLET BY MOUTH ONCE DAILY  Dispense: 90 tablet; Refill: 0    If protocol passes may refill for 12 months if within 3 months of last provider visit (or a total of 15 months).             Passed - Blood pressure filed in past 12 months     BP Readings from Last 1 Encounters:   02/11/20 108/54                          Strain    A strain is a stretch or tear in one of the muscles in your body. This is caused by an injury to the area such as a twisting mechanism. Symptoms include pain, swelling, or bruising. Rest that area over the next several days and slowly resume activity when tolerated. Ice can help with swelling and pain.     SEEK IMMEDIATE MEDICAL CARE IF YOU HAVE ANY OF THE FOLLOWING SYMPTOMS: worsening pain, inability to move that body part, numbness or tingling.    Allergic Reaction    An allergic reaction is an abnormal reaction to a substance (allergen) by the body's defense system. Common allergens include medicines, food, insect bites or stings, and blood products. The body releases certain proteins into the blood that can cause a variety of symptoms such as an itchy rash, wheezing, swelling of the face/lips/tongue/throat, abdominal pain, nausea or vomiting. An allergic reaction is usually treated with medication. If your health care provider prescribed you an epinephrine injection device, make sure to keep it with you at all times.    SEEK IMMEDIATE MEDICAL CARE IF YOU HAVE ANY OF THE FOLLOWING SYMPTOMS: allergic reaction severe enough that required you to use epinephrine, tightness in your chest, swelling around your lips/tongue/throat, abdominal pain, vomiting or diarrhea, or lightheadedness/dizziness. These symptoms may represent a serious problem that is an emergency. Do not wait to see if the symptoms will go away. Use your auto-injector pen or anaphylaxis kit as you have been instructed. Call 911 and do not drive yourself to the hospital.

## 2022-03-11 NOTE — ED PROVIDER NOTE - OBJECTIVE STATEMENT
----- Message from GIANCARLO Roa sent at 3/11/2022  1:38 PM CST -----  Testosterone to low  Change to 1ml every 14 days  Recheck cbc and testosterone in 6 weeks 32 y/o m no pmh presents with recurrent itching/burning rash to genitalia over the past 3 months.  Pt reports spontaneously resolves each time, has been there for ~ 1 week.  Denies fever, hx STDs, testicular pain, dysuria, urethral discharge, all other ROS negative.

## 2022-07-11 NOTE — ED ADULT TRIAGE NOTE - BSA (M2)
Outreach attempt was made to schedule a Medicare Wellness Visit. This was the first attempt. Contact was not made, left message.             MWV is was due by 4/20/22  
2

## 2022-08-30 NOTE — ED ADULT TRIAGE NOTE - BP NONINVASIVE SYSTOLIC (MM HG)
Intensivist:  Discussed with JAMA Childress of cv surgery service.  No immediate operative plans.  Palliative care consult at their suggestion.  Will transfer to hospitalist service as she does not have further ICU needs at this time.   127

## 2022-08-30 NOTE — ED ADULT NURSE NOTE - FALL HARM RISK CONCLUSION
The pt presents for a f/u for GERD and constipation. Today she states constipation has improved with Senna OTC.  Linzess 72mcg did not improve constipation. She is requesting a rx for Senna in liquid form.  She continues to taking Pantoprazole 40mg daily, but states over the last week LUQ abdominal pain has returned.  She denies recent labs or imaging since CT for LUQ abd pain which was normal.
Universal Safety Interventions

## 2023-01-11 NOTE — ED PROVIDER NOTE - INGUINAL REGION
no swelling/no hernia, no LN, no erythema.
Pt complains pf generalized abdominal pain and left sided chest pain. S/p open heart surgery 09/2022

## 2023-02-08 NOTE — ED PROVIDER NOTE - NS ED ATTENDING NAME FT
[FreeTextEntry1] : Plan: Today's assessment was performed with the assistance of the patient's parent as an independent historian. Malgorzata is a 16-year-old boy with history of Marfan syndrome who has significant scoliosis, now measuring 91°. He is no longer wearing brace. Family history for scoliosis surgery in sister undergoing surgery. Natural history of scoliosis has been discussed. Risser 4 with some skeletal growth potential. Scoliosis can continue to progress.  He is scheduled for posterior spinal fusion with instrumentation on 2/15/2023 and presents today with mother for presurgical planning.  He underwent G-tube placement in August and has had significant weight gain.  He continues with GI follow-up.  He is followed by pulmonary as well as cardiology.  Full spine MRI with no evidence of intraspinal abnormalities.\par \par The options of surgery were again discussed. Parents do understand curve larger than 50°, based on natural history, tend to progress 1-2° /1 in adult life. Curves 90° or more can cause significant cardiac and pulmonary compromise. Large curves are likely at risk for back pain, arthritis in adult life.\par \par Surgical procedure discussed at length. Surgery including spine fusion with instrumentation, osteotomies, Cell Saver, multimodal spinal cord monitoring, bone grafting (autograft/allograft ), thoracoplasty, , and navigated guidance versus fluoroscopic guidance and complex wound closure is planned. Perioperative plan discussed. Wakeup test discussed. Transfusion risk discussed. Neural monitoring explained. Possible need for rib resection has been discussed. Use of fluoroscopy and AIRO navigation explained. Infection prevention steps discussed. Postoperative pain management protocol discussed. Intraspinal Duramorph discussed. Preoperative and postoperative instructions reviewed. Hospital day is usually about 3-4 days with one night in the PICU. We have implemented a rapid recovery pathway that incorporates a micro-dose of intraspinal Duramorph at the time of surgery which eliminate the need for opioid PCA and decreases opioid needs postoperatively for pain control. This also decreases constipation rate and allows patients to  resume diet on the same day of surgery. Pain management is done in collaboration with a pediatric pain specialist. We have a dedicated intraoperative and postoperative pediatric spine team that includes pediatric spine anesthesiologists, neurologist for intraoperative or real-time spinal cord monitoring to increase the safety of surgery, dedicated surgical team, pediatric hospitalist,  and  along with child life therapists. This approach has allowed for optimal management of patient's, increased surgical safety, decrease risk of blood transfusion, decreased need for opioid and allows for patient to be discharged to home earlier. At discharge the patient is able to walk and climb stairs independently. We will arrange for visiting nurse services for home care as needed. Sutures are dissolvable and wound closure was done by plastic surgery which decreases the risk of infection. We also utilized intraoperative low-dose CT scan to ensure accuracy of spinal implants. In addition we perform multimodal spinal cord monitoring and real-time which is supervised by neurophysiologist and neurologists to decrease the risk of neurological injury and improve safety of the surgical procedure. This approach has improved surgical safety, accuracy and efficiency thereby ensuring superior patient now comes. In addition [facility] is the only [place] certified [facility] in [facility],  ensuring the highest level of nursing care. \par \par All the risks and complications of surgery including the risk of infection, nonunion, implant failure, complete paralysis, incomplete paralysis, bladder/bowel paralysis, organ injury, vascular injury, mortality, CSF leak, pleural leak, decompensation, resurgery, extension of fusion, junctional kyphosis, arthritis, organ injury, vascular injury, mortality, screw misplacement, and need for screw removal were explained. Informed consent obtained from mother. All questions were answered.  Understanding verbalized. We will proceed with surgery as planned.\par \par I, Ciarra Tate, have acted as a scribe and documented the above information for Dr. Hamilton\par \par The above documentation completed by the scribe is an accurate record of both my words and actions.\par \par This note was generated using Dragon medical dictation software. A reasonable effort has been made for proofreading its contents, but typos may still remain. If there are any questions or points of clarification needed please do not hesitate to contact my office.\par \par The Advanced Clinical Provider (ACP) spent 15 minutes on examination, HPI for interval changes, and treatment compliance\par The Physician Spent 10 minutes examining, discussing treatment options, natural history, prognosis, treatment goals, activities limitations/modifications, genetics\par Physician and ACP spent 5-10 minutes reviewing all imagings\par Physician and/or ACP spent 5 minutes demonstrating back, core strengthening and posture correction exercises and going over the proper form as well the need to be regular on a daily basis. Instructions printed.\par The physician and/or ACP spent 5 minutes documenting in the EHR.\par Total time on day of service was over 30 minutes, supporting code [id number].\par \par \par \par 
lois

## 2023-04-09 ENCOUNTER — EMERGENCY (EMERGENCY)
Facility: HOSPITAL | Age: 38
LOS: 1 days | Discharge: HOME | End: 2023-04-09
Attending: EMERGENCY MEDICINE | Admitting: EMERGENCY MEDICINE
Payer: COMMERCIAL

## 2023-04-09 VITALS
HEART RATE: 72 BPM | OXYGEN SATURATION: 98 % | RESPIRATION RATE: 18 BRPM | TEMPERATURE: 98 F | SYSTOLIC BLOOD PRESSURE: 124 MMHG | DIASTOLIC BLOOD PRESSURE: 78 MMHG

## 2023-04-09 VITALS
RESPIRATION RATE: 18 BRPM | TEMPERATURE: 98 F | OXYGEN SATURATION: 100 % | WEIGHT: 182.1 LBS | HEIGHT: 72 IN | HEART RATE: 67 BPM | DIASTOLIC BLOOD PRESSURE: 76 MMHG | SYSTOLIC BLOOD PRESSURE: 120 MMHG

## 2023-04-09 LAB
ALBUMIN SERPL ELPH-MCNC: 4.1 G/DL — SIGNIFICANT CHANGE UP (ref 3.3–5)
ALP SERPL-CCNC: 72 U/L — SIGNIFICANT CHANGE UP (ref 40–120)
ALT FLD-CCNC: 15 U/L — SIGNIFICANT CHANGE UP (ref 10–45)
ANION GAP SERPL CALC-SCNC: 7 MMOL/L — SIGNIFICANT CHANGE UP (ref 5–17)
AST SERPL-CCNC: 15 U/L — SIGNIFICANT CHANGE UP (ref 10–40)
BASOPHILS # BLD AUTO: 0.01 K/UL — SIGNIFICANT CHANGE UP (ref 0–0.2)
BASOPHILS NFR BLD AUTO: 0.2 % — SIGNIFICANT CHANGE UP (ref 0–2)
BILIRUB SERPL-MCNC: 0.9 MG/DL — SIGNIFICANT CHANGE UP (ref 0.2–1.2)
BUN SERPL-MCNC: 10 MG/DL — SIGNIFICANT CHANGE UP (ref 7–23)
CALCIUM SERPL-MCNC: 8.8 MG/DL — SIGNIFICANT CHANGE UP (ref 8.4–10.5)
CHLORIDE SERPL-SCNC: 102 MMOL/L — SIGNIFICANT CHANGE UP (ref 96–108)
CO2 SERPL-SCNC: 27 MMOL/L — SIGNIFICANT CHANGE UP (ref 22–31)
CREAT SERPL-MCNC: 1.04 MG/DL — SIGNIFICANT CHANGE UP (ref 0.5–1.3)
EGFR: 95 ML/MIN/1.73M2 — SIGNIFICANT CHANGE UP
EOSINOPHIL # BLD AUTO: 0.17 K/UL — SIGNIFICANT CHANGE UP (ref 0–0.5)
EOSINOPHIL NFR BLD AUTO: 3 % — SIGNIFICANT CHANGE UP (ref 0–6)
GLUCOSE SERPL-MCNC: 100 MG/DL — HIGH (ref 70–99)
HCT VFR BLD CALC: 46 % — SIGNIFICANT CHANGE UP (ref 39–50)
HGB BLD-MCNC: 14.7 G/DL — SIGNIFICANT CHANGE UP (ref 13–17)
IMM GRANULOCYTES NFR BLD AUTO: 0.3 % — SIGNIFICANT CHANGE UP (ref 0–0.9)
LIDOCAIN IGE QN: 38 U/L — SIGNIFICANT CHANGE UP (ref 7–60)
LYMPHOCYTES # BLD AUTO: 0.6 K/UL — LOW (ref 1–3.3)
LYMPHOCYTES # BLD AUTO: 10.4 % — LOW (ref 13–44)
MCHC RBC-ENTMCNC: 29.4 PG — SIGNIFICANT CHANGE UP (ref 27–34)
MCHC RBC-ENTMCNC: 32 GM/DL — SIGNIFICANT CHANGE UP (ref 32–36)
MCV RBC AUTO: 92 FL — SIGNIFICANT CHANGE UP (ref 80–100)
MONOCYTES # BLD AUTO: 0.41 K/UL — SIGNIFICANT CHANGE UP (ref 0–0.9)
MONOCYTES NFR BLD AUTO: 7.1 % — SIGNIFICANT CHANGE UP (ref 2–14)
NEUTROPHILS # BLD AUTO: 4.54 K/UL — SIGNIFICANT CHANGE UP (ref 1.8–7.4)
NEUTROPHILS NFR BLD AUTO: 79 % — HIGH (ref 43–77)
NRBC # BLD: 0 /100 WBCS — SIGNIFICANT CHANGE UP (ref 0–0)
PLATELET # BLD AUTO: 191 K/UL — SIGNIFICANT CHANGE UP (ref 150–400)
POTASSIUM SERPL-MCNC: 4.1 MMOL/L — SIGNIFICANT CHANGE UP (ref 3.5–5.3)
POTASSIUM SERPL-SCNC: 4.1 MMOL/L — SIGNIFICANT CHANGE UP (ref 3.5–5.3)
PROT SERPL-MCNC: 7.4 G/DL — SIGNIFICANT CHANGE UP (ref 6–8.3)
RBC # BLD: 5 M/UL — SIGNIFICANT CHANGE UP (ref 4.2–5.8)
RBC # FLD: 12.9 % — SIGNIFICANT CHANGE UP (ref 10.3–14.5)
SODIUM SERPL-SCNC: 136 MMOL/L — SIGNIFICANT CHANGE UP (ref 135–145)
WBC # BLD: 5.75 K/UL — SIGNIFICANT CHANGE UP (ref 3.8–10.5)
WBC # FLD AUTO: 5.75 K/UL — SIGNIFICANT CHANGE UP (ref 3.8–10.5)

## 2023-04-09 PROCEDURE — 96374 THER/PROPH/DIAG INJ IV PUSH: CPT

## 2023-04-09 PROCEDURE — 80053 COMPREHEN METABOLIC PANEL: CPT

## 2023-04-09 PROCEDURE — 85025 COMPLETE CBC W/AUTO DIFF WBC: CPT

## 2023-04-09 PROCEDURE — 96361 HYDRATE IV INFUSION ADD-ON: CPT

## 2023-04-09 PROCEDURE — 99284 EMERGENCY DEPT VISIT MOD MDM: CPT

## 2023-04-09 PROCEDURE — 36415 COLL VENOUS BLD VENIPUNCTURE: CPT

## 2023-04-09 PROCEDURE — 99284 EMERGENCY DEPT VISIT MOD MDM: CPT | Mod: 25

## 2023-04-09 PROCEDURE — 83690 ASSAY OF LIPASE: CPT

## 2023-04-09 RX ORDER — SODIUM CHLORIDE 9 MG/ML
1000 INJECTION INTRAMUSCULAR; INTRAVENOUS; SUBCUTANEOUS ONCE
Refills: 0 | Status: COMPLETED | OUTPATIENT
Start: 2023-04-09 | End: 2023-04-09

## 2023-04-09 RX ORDER — ONDANSETRON 8 MG/1
4 TABLET, FILM COATED ORAL ONCE
Refills: 0 | Status: COMPLETED | OUTPATIENT
Start: 2023-04-09 | End: 2023-04-09

## 2023-04-09 RX ADMIN — SODIUM CHLORIDE 1000 MILLILITER(S): 9 INJECTION INTRAMUSCULAR; INTRAVENOUS; SUBCUTANEOUS at 18:06

## 2023-04-09 RX ADMIN — ONDANSETRON 4 MILLIGRAM(S): 8 TABLET, FILM COATED ORAL at 17:05

## 2023-04-09 RX ADMIN — SODIUM CHLORIDE 1000 MILLILITER(S): 9 INJECTION INTRAMUSCULAR; INTRAVENOUS; SUBCUTANEOUS at 17:05

## 2023-04-09 NOTE — ED ADULT NURSE REASSESSMENT NOTE - NS ED NURSE REASSESS COMMENT FT1
Patient a/oX3, c/o of diarrhea episodes, none since arrival to ED, with nausea, no vomitting.  Vital signs stable.  Left AC PIV #20 in place, all labs sent, no complications.  NSS 1 L bolus ongoing, tolerating well.  NPO observed.

## 2023-04-09 NOTE — ED ADULT TRIAGE NOTE - HEIGHT IN FEET
-BUN/creatinine had been elevated back on 01/25 with a creatinine of 1 7 up from her baseline of 1 0  Will recheck labs today and start IV fluids for hydration if needed  6

## 2023-04-09 NOTE — ED ADULT TRIAGE NOTE - CHIEF COMPLAINT QUOTE
"I have been having diarrhea since yesterday and I was also fasting and I feel tired, and have a headache". patient denies fevers and chills. Patient last used antibiotics over 3 weeks ago.

## 2023-04-09 NOTE — ED PROVIDER NOTE - OBJECTIVE STATEMENT
37-year-old patient previously healthy presenting with diarrhea and abdominal cramping.  Patient states ongoing for the last 2 days.  No associated fever blood in stool antibiotic use extended travel.  Patient states may have eaten something abnormal prior to symptoms starting including undercooked rice and beans.  Has tried Imodium for symptoms.  No other complaints.  Was also fasting For Ramadan   When symptoms started.

## 2023-04-09 NOTE — ED ADULT NURSE NOTE - OBJECTIVE STATEMENT
Patient c/o of episodes of diarrhea started last night, non-bloody, with nausea, no vomitting, mild abdominal cramping pain, no fever/chills, states started after eating beans last night that may have been undercooked.

## 2023-04-09 NOTE — ED PROVIDER NOTE - PATIENT PORTAL LINK FT
You can access the FollowMyHealth Patient Portal offered by [facility] by registering at the following website: [email or web address]. By joining [facility]’s FollowMyHealth portal, you will also be able to view your health information using other applications (apps) compatible with our system.

## 2023-04-09 NOTE — ED PROVIDER NOTE - CLINICAL SUMMARY MEDICAL DECISION MAKING FREE TEXT BOX
Patient with complaints of nonbloody diarrhea, well-appearing vital signs stable, no focal abdominal discomfort,  obtained labs within normal limits and hydrated through IV.  Patient requesting DC, will DC with supportive care instructions strict return precautions given, no headache at present.

## 2023-04-09 NOTE — ED PROVIDER NOTE - NSFOLLOWUPINSTRUCTIONS_ED_ALL_ED_FT
[facility]  Family Medicine  [address] [place] [address]  Phone: [phone]    Diarrhea    Diarrhea is frequent loose or watery bowel movements that has many causes. Diarrhea can make you feel weak and cause you to become dehydrated. Diarrhea typically lasts 2–3 days, but can last longer if it is a sign of something more serious. Drink clear fluids to prevent dehydration. Eat bland, easy-to-digest foods as tolerated.     SEEK IMMEDIATE MEDICAL CARE IF YOU HAVE ANY OF THE FOLLOWING SYMPTOMS: high fevers, lightheadedness/dizziness, chest pain, black or bloody stools, shortness of breath, severe abdominal or back pain, or any signs of dehydration.

## 2023-04-09 NOTE — ED ADULT NURSE REASSESSMENT NOTE - NS ED NURSE REASSESS COMMENT FT1
Labs resulted, no diarrhea, no new symptom complaint.  Vital signs stable.  Fluids PO tolerated well.  NSS 1 L bolus completed.  Dischrged judah ome in stable condition.

## 2023-04-10 DIAGNOSIS — R19.7 DIARRHEA, UNSPECIFIED: ICD-10-CM

## 2023-04-10 DIAGNOSIS — R10.9 UNSPECIFIED ABDOMINAL PAIN: ICD-10-CM

## 2023-09-27 ENCOUNTER — EMERGENCY (EMERGENCY)
Facility: HOSPITAL | Age: 38
LOS: 1 days | Discharge: HOME | End: 2023-09-27
Admitting: STUDENT IN AN ORGANIZED HEALTH CARE EDUCATION/TRAINING PROGRAM
Payer: COMMERCIAL

## 2023-09-27 VITALS
HEART RATE: 55 BPM | HEIGHT: 74 IN | OXYGEN SATURATION: 100 % | TEMPERATURE: 99 F | WEIGHT: 190.04 LBS | DIASTOLIC BLOOD PRESSURE: 81 MMHG | RESPIRATION RATE: 18 BRPM | SYSTOLIC BLOOD PRESSURE: 136 MMHG

## 2023-09-27 DIAGNOSIS — R05.2 SUBACUTE COUGH: ICD-10-CM

## 2023-09-27 DIAGNOSIS — Z20.2 CONTACT WITH AND (SUSPECTED) EXPOSURE TO INFECTIONS WITH A PREDOMINANTLY SEXUAL MODE OF TRANSMISSION: ICD-10-CM

## 2023-09-27 DIAGNOSIS — Z20.822 CONTACT WITH AND (SUSPECTED) EXPOSURE TO COVID-19: ICD-10-CM

## 2023-09-27 LAB
FLUAV AG NPH QL: SIGNIFICANT CHANGE UP
FLUBV AG NPH QL: SIGNIFICANT CHANGE UP
HIV 1+2 AB+HIV1 P24 AG SERPL QL IA: SIGNIFICANT CHANGE UP
RSV RNA NPH QL NAA+NON-PROBE: SIGNIFICANT CHANGE UP
SARS-COV-2 RNA SPEC QL NAA+PROBE: SIGNIFICANT CHANGE UP

## 2023-09-27 PROCEDURE — 86780 TREPONEMA PALLIDUM: CPT

## 2023-09-27 PROCEDURE — 87637 SARSCOV2&INF A&B&RSV AMP PRB: CPT

## 2023-09-27 PROCEDURE — 96372 THER/PROPH/DIAG INJ SC/IM: CPT

## 2023-09-27 PROCEDURE — 71046 X-RAY EXAM CHEST 2 VIEWS: CPT | Mod: 26

## 2023-09-27 PROCEDURE — 87389 HIV-1 AG W/HIV-1&-2 AB AG IA: CPT

## 2023-09-27 PROCEDURE — 36415 COLL VENOUS BLD VENIPUNCTURE: CPT

## 2023-09-27 PROCEDURE — 99283 EMERGENCY DEPT VISIT LOW MDM: CPT | Mod: 25

## 2023-09-27 PROCEDURE — 99283 EMERGENCY DEPT VISIT LOW MDM: CPT

## 2023-09-27 PROCEDURE — 71046 X-RAY EXAM CHEST 2 VIEWS: CPT

## 2023-09-27 RX ORDER — CEFTRIAXONE 500 MG/1
500 INJECTION, POWDER, FOR SOLUTION INTRAMUSCULAR; INTRAVENOUS ONCE
Refills: 0 | Status: COMPLETED | OUTPATIENT
Start: 2023-09-27 | End: 2023-09-27

## 2023-09-27 RX ADMIN — Medication 100 MILLIGRAM(S): at 05:27

## 2023-09-27 RX ADMIN — CEFTRIAXONE 500 MILLIGRAM(S): 500 INJECTION, POWDER, FOR SOLUTION INTRAMUSCULAR; INTRAVENOUS at 05:26

## 2023-09-27 NOTE — ED PROVIDER NOTE - CARE PLAN
1 Principal Discharge DX:	STD exposure   Surgeon/Pathologist Verbiage (Will Incorporate Name Of Surgeon From Intro If Not Blank): operated in two distinct and integrated capacities as the surgeon and pathologist.

## 2023-09-27 NOTE — ED PROVIDER NOTE - NSFOLLOWUPINSTRUCTIONS_ED_ALL_ED_FT
you have been treated in the Emergency Department for Gonorrhea / Chlamydia.    You received   Ceftriaxone 500mg intramuscular injection once  Doxycycline 100mg tablet    Continue taking antibiotics as prescribed.   Take DOXYCYCLINE 100mg tablet, one tablet twice a day for 7 days. Prescription sent to your pharmacy.    Follow up with your Primary Care Doctor (the number for our clinic has been provided if you need a Primary Care Doctor) within one week.    Do not have sexual intercourse or engage in other sexual activity until you have been cleared by your Primary Care Doctor. Be sure all partners that you have had unprotected sex with have been treated for the same thing.    Urine testing / STI Test Results -  Someone from the ED will call you in 2-3 days with your test results.    Return to the Emergency Department for persistent, worsening, or new symptoms including penile/testicular pain, lower abdominal pain, fever, chills, nausea, vomiting, change in your urination such as burning with urination, or any other serious concerns.

## 2023-09-27 NOTE — ED ADULT TRIAGE NOTE - CHIEF COMPLAINT QUOTE
Patient to the ED c/o dry, intermittent cough x 2 months. Also requesting STI testing, denies symptoms but states partner tested +. AAOX4, NAD.

## 2023-09-27 NOTE — ED PROVIDER NOTE - OBJECTIVE STATEMENT
38 yr old male, denies medical hx, presents to the Emergency Department w multiple complaints.   1. cough x 2 months, dry cough. no fever, cp, sob, leg swelling.   2. requesting sti testing. partner recently tested positive for ?gonorrhea. would like testing for all stis and would like prophylactic tx for gonorrhea and chlamydia. no gu symptoms. no hx sti.

## 2023-09-27 NOTE — ED PROVIDER NOTE - PATIENT PORTAL LINK FT
You can access the FollowMyHealth Patient Portal offered by [facility] by registering at the following website: [email or web address]. By joining [facility]’s FollowMyHealth portal, you will also be able to view your health information using other applications (apps) compatible with our system.

## 2023-09-27 NOTE — ED PROVIDER NOTE - CLINICAL SUMMARY MEDICAL DECISION MAKING FREE TEXT BOX
otherwise healthy here w 1. cough x 2 months, no change in symptoms recently. 2. sti exposure, requesting testing and prophylactic tx  pt well appearing, stable vitals, exam unremarkable.   1. dry cough, lungs clear. will send viral swab. cxr r/o pna although low suspicion. otherwise f/u w pulm  2. sti exposure. asymptomatic. requesting gonorrhea / chlamydia testing and prophylactic tx. also requesting syphilis and hiv testing.   given ceftriaxone IM x1 and doxy bid x7 days. results to be followed up

## 2023-09-27 NOTE — ED PROVIDER NOTE - PHYSICAL EXAMINATION
Constitutional : Well appearing, non-toxic, no acute distress. awake, alert, oriented to person, place, time/situation.  Head : head normocephalic, atraumatic  EENMT : eyes clear bilaterally, PERRL, EOMI. airway patent. moist mucous membranes. neck supple.  Cardiac : Normal rate, regular rhythm. No murmur appreciated  Resp : Breath sounds clear and equal bilaterally. Respirations even and unlabored.   Gastro : abdomen soft, nontender, nondistended. no rebound or guarding. no CVAT.  MSK :  range of motion is not limited, no muscle or joint tenderness  Vasc : Extremities warm and well perfused. 2+ radial and DP pulses. cap refill <2 seconds  Neuro : Alert and oriented, CNII-XII grossly intact, no focal deficits, no motor or sensory deficits.  Skin : Skin normal color for race, warm, dry and intact. No evidence of rash.  Psych : Alert and oriented to person, place, time/situation. normal mood and affect. no apparent risk to self or others.

## 2023-09-27 NOTE — ED ADULT NURSE NOTE - OBJECTIVE STATEMENT
Patient is a 38yoM presenting to the ED with a cough x 2 months. Pt is axox3, spont breathing on RA. Ambulated well. States that he has been having a dry cough, denies chest pain/sob, denies fevers/chills.

## 2023-09-27 NOTE — ED ADULT NURSE REASSESSMENT NOTE - NS ED NURSE REASSESS COMMENT FT1
Patient ambulated well without assistance, made aware of return precautions, verbalized understanding.

## 2023-09-27 NOTE — ED PROVIDER NOTE - PROGRESS NOTE DETAILS
cxr clear. viral swab negative.   treat for gc/ng. syphilis and hiv pending  pt stable for dc     All results reviewed with the patient verbally. Discharge plan and return precautions d/w pt who verbalized understanding and agrees with plan. All questions answered. Vitals WNL. Ready for d/c.

## 2023-09-28 LAB — T PALLIDUM AB TITR SER: NEGATIVE — SIGNIFICANT CHANGE UP

## 2024-02-22 ENCOUNTER — NON-APPOINTMENT (OUTPATIENT)
Age: 39
End: 2024-02-22

## 2024-08-28 NOTE — ED ADULT TRIAGE NOTE - NS ED NURSE BANDS TYPE
[facility]  [facility]  [address] [place] [address] [place] [id number]  Dept: [phone]       Dominik Naqvi is a 28 y.o. male who presents today for his  medical conditions/complaintsas noted below.  Dominik Naqvi is c/o of New Patient, Establish Care, and Hypertension      HPI:     HPI    Is a very pleasant 28-year-old male who presents today to establish care.  Patient has known hypertension for approximately the last year.  Was previously on amlodipine, but has been off for approximately 12 months.  He is asymptomatic today.  Unsure effect of amlodipine in the past, but states he tolerated well.  Plan to restart today and order baseline labs.  We also discussed the importance of ambulatory blood pressure monitoring.  Goal blood pressures less than 140/80.  He believes he is able to purchase a blood pressure cuff within the next couple weeks to comply.  Denies any concerns for anxiety or depression.  Otherwise, no chest pain/chest tightness or dyspnea.  No abdominal pain, nausea/vomiting or diarrhea.  Diet and appetite remain intact.  Denies any concerns for sleep apnea.    Minimal knowledge of family history.  Ovarian cancer in his mother and hypertension and likely secondary conditions in his maternal grandfather.    Amlodipine 5 mg sent to pharmacy on file.  Will patient follow-up in 4 to 6 weeks.  Offered virtual visits so long as he had a blood pressure cuff to report home readings.    Follow labs    Past Medical History:   Diagnosis Date    ADHD (attention deficit hyperactivity disorder) 2008    Asthma 2003    Dr. Keith    Hypertension       Past Surgical History:   Procedure Laterality Date    ADENOIDECTOMY  2007    TONSILLECTOMY  2007     Family History   Problem Relation Age of Onset    Cancer Mother         ovarian cancer    High Blood Pressure Maternal Grandfather      Social History     Tobacco Use    Smoking status: Never    Smokeless  Name band;

## 2024-09-07 NOTE — ED ADULT NURSE NOTE - CHIEF COMPLAINT
The patient is a 38y Male complaining of cough.
I will STOP taking the medications listed below when I get home from the hospital:    ibuprofen 600 mg oral tablet  -- 1 tab(s) by mouth every 6 hours, As Needed    acetaminophen 325 mg oral tablet  -- 3 tab(s) by mouth every 6 hours as needed for -
